# Patient Record
Sex: FEMALE | Race: BLACK OR AFRICAN AMERICAN | NOT HISPANIC OR LATINO | Employment: UNEMPLOYED | URBAN - NONMETROPOLITAN AREA
[De-identification: names, ages, dates, MRNs, and addresses within clinical notes are randomized per-mention and may not be internally consistent; named-entity substitution may affect disease eponyms.]

---

## 2022-11-03 ENCOUNTER — HOSPITAL ENCOUNTER (EMERGENCY)
Facility: HOSPITAL | Age: 37
Discharge: HOME | End: 2022-11-04
Attending: EMERGENCY MEDICINE

## 2022-11-03 VITALS
DIASTOLIC BLOOD PRESSURE: 83 MMHG | RESPIRATION RATE: 15 BRPM | WEIGHT: 155.87 LBS | SYSTOLIC BLOOD PRESSURE: 117 MMHG | TEMPERATURE: 97.8 F | OXYGEN SATURATION: 98 % | HEART RATE: 90 BPM

## 2022-11-03 DIAGNOSIS — F23 ACUTE PSYCHOSIS (HCC): Primary | ICD-10-CM

## 2022-11-03 DIAGNOSIS — F20.5 SCHIZOPHRENIA, RESIDUAL, CHRONIC (HCC): ICD-10-CM

## 2022-11-03 DIAGNOSIS — F15.10 METHAMPHETAMINE ABUSE (HCC): ICD-10-CM

## 2022-11-03 LAB
AMPHETAMINES SERPL QL SCN: POSITIVE
BARBITURATES UR QL: NEGATIVE
BENZODIAZ UR QL: NEGATIVE
BILIRUB UR QL STRIP: NEGATIVE
CLARITY UR: CLEAR
COCAINE UR QL: NEGATIVE
COLOR UR: YELLOW
ETHANOL EXG-MCNC: 0 MG/DL
EXT PREG TEST URINE: NEGATIVE
EXT. CONTROL ED NAV: NORMAL
FLUAV RNA RESP QL NAA+PROBE: NEGATIVE
FLUBV RNA RESP QL NAA+PROBE: NEGATIVE
GLUCOSE UR STRIP-MCNC: NEGATIVE MG/DL
HGB UR QL STRIP.AUTO: NEGATIVE
KETONES UR STRIP-MCNC: ABNORMAL MG/DL
LEUKOCYTE ESTERASE UR QL STRIP: NEGATIVE
METHADONE UR QL: NEGATIVE
NITRITE UR QL STRIP: NEGATIVE
OPIATES UR QL SCN: POSITIVE
OXYCODONE+OXYMORPHONE UR QL SCN: NEGATIVE
PCP UR QL: NEGATIVE
PH UR STRIP.AUTO: 6 [PH]
PROT UR STRIP-MCNC: NEGATIVE MG/DL
RSV RNA RESP QL NAA+PROBE: NEGATIVE
SARS-COV-2 RNA RESP QL NAA+PROBE: NEGATIVE
SP GR UR STRIP.AUTO: 1.01 (ref 1–1.03)
THC UR QL: NEGATIVE
UROBILINOGEN UR QL STRIP.AUTO: 0.2 E.U./DL

## 2022-11-03 RX ORDER — KETAMINE HYDROCHLORIDE 50 MG/ML
5 INJECTION, SOLUTION, CONCENTRATE INTRAMUSCULAR; INTRAVENOUS ONCE
Status: COMPLETED | OUTPATIENT
Start: 2022-11-03 | End: 2022-11-03

## 2022-11-03 RX ORDER — WATER 1000 ML/1000ML
INJECTION, SOLUTION INTRAVENOUS
Status: COMPLETED
Start: 2022-11-03 | End: 2022-11-03

## 2022-11-03 RX ORDER — ZIPRASIDONE MESYLATE 20 MG/ML
20 INJECTION, POWDER, LYOPHILIZED, FOR SOLUTION INTRAMUSCULAR ONCE
Status: COMPLETED | OUTPATIENT
Start: 2022-11-03 | End: 2022-11-03

## 2022-11-03 RX ADMIN — ZIPRASIDONE MESYLATE 20 MG: 20 INJECTION, POWDER, LYOPHILIZED, FOR SOLUTION INTRAMUSCULAR at 12:35

## 2022-11-03 RX ADMIN — WATER: 1 INJECTION INTRAMUSCULAR; INTRAVENOUS; SUBCUTANEOUS at 13:13

## 2022-11-03 RX ADMIN — KETAMINE HYDROCHLORIDE 349 MG: 50 INJECTION, SOLUTION INTRAMUSCULAR; INTRAVENOUS at 13:11

## 2022-11-03 NOTE — ED PROVIDER NOTES
History  Chief Complaint   Patient presents with   • Psychiatric Evaluation     Patient arrived by state police for a psych eval  Patient was tearful upon triage  Patient states she has been living out of her car for a few days  Patient denies SI/HI  Patient is a 39-year-old female brought in by [employer] and EMS for psychiatric evaluation  Per reports, patient has been sleeping in her car and has been parking outside of a  facility and when the facility reported her activity to police, she became agitated upon arrival   Patient states she is homeless and informed me she has from the [facility]  She states she is in this area to help a friend to has MS and depression  Patient does states she is homeless  Patient denies any recent alcohol or drug usage  She does have history of schizophrenia and previous methamphetamine usage  Patient is able to answer some questions but otherwise appears quite tangential and agitated at times  Patient did require chemical sedation for patient and staff safety  Will continue with [facility] evaluation including drug screen breath alcohol and crisis evaluation  No outward signs of trauma  None       History reviewed  No pertinent past medical history  History reviewed  No pertinent surgical history  History reviewed  No pertinent family history  I have reviewed and agree with the history as documented  E-Cigarette/Vaping     E-Cigarette/Vaping Substances     Social History     Tobacco Use   • Smoking status: Never Smoker   • Smokeless tobacco: Never Used   Substance Use Topics   • Alcohol use: Not Currently   • Drug use: Yes     Types: Marijuana       Review of Systems   Unable to perform ROS: Psychiatric disorder       Physical Exam  Physical Exam  Vitals and nursing note reviewed  Constitutional:       General: She is awake  She is not in acute distress  Appearance: Normal appearance  She is well-developed     HENT: Head: Normocephalic and atraumatic  Mouth/Throat:      Mouth: Mucous membranes are moist    Eyes:      General: No scleral icterus  Extraocular Movements: Extraocular movements intact  Conjunctiva/sclera: Conjunctivae normal    Cardiovascular:      Rate and Rhythm: Normal rate and regular rhythm  Heart sounds: Normal heart sounds  No murmur heard  No friction rub  No gallop  Pulmonary:      Effort: Pulmonary effort is normal  No respiratory distress  Breath sounds: Normal breath sounds  No wheezing, rhonchi or rales  Abdominal:      Palpations: Abdomen is soft  There is no mass  Tenderness: There is no abdominal tenderness  There is no guarding or rebound  Hernia: No hernia is present  Musculoskeletal:         General: Normal range of motion  Cervical back: Normal range of motion and neck supple  Right lower leg: No edema  Left lower leg: No edema  Skin:     General: Skin is warm and dry  Coloration: Skin is not pale  Findings: No lesion  Neurological:      General: No focal deficit present  Mental Status: She is alert  Mental status is at baseline  Psychiatric:         Mood and Affect: Mood is anxious  Affect is angry and tearful  Speech: Speech is rapid and pressured and tangential          Behavior: Behavior is uncooperative, agitated, aggressive and hyperactive  Thought Content:  Thought content is delusional          Vital Signs  ED Triage Vitals [11/03/22 1320]   Temperature Pulse Respirations Blood Pressure SpO2   97 5 °F (36 4 °C) (!) 107 17 133/73 97 %      Temp Source Heart Rate Source Patient Position - Orthostatic VS BP Location FiO2 (%)   Temporal Monitor -- -- --      Pain Score       No Pain           Vitals:    11/03/22 1530 11/03/22 1545 11/03/22 1645 11/03/22 1854   BP:    117/83   Pulse: 83 84 84 90         Visual Acuity      ED Medications  Medications   ziprasidone (GEODON) IM injection 20 mg (20 mg Intramuscular Given 11/3/22 1235)   sterile water injection **ADS Override Pull** (  Given 11/3/22 1313)   ketamine (KETALAR) 5 mg/kg (349 mg Intramuscular Given 11/3/22 1311)       Diagnostic Studies  Results Reviewed     Procedure Component Value Units Date/Time    Rapid drug screen, urine [[id number]]  (Abnormal) Collected: 11/03/22 1936    Lab Status: Final result Specimen: Urine, Clean Catch Updated: 11/03/22 2021     Amph/Meth UR Positive     Barbiturate Ur Negative     Benzodiazepine Urine Negative     Cocaine Urine Negative     Methadone Urine Negative     Opiate Urine Positive     PCP Ur Negative     THC Urine Negative     Oxycodone Urine Negative    Narrative:      Presumptive report  If requested, specimen will be sent to reference lab for confirmation  FOR MEDICAL PURPOSES ONLY  IF CONFIRMATION NEEDED PLEASE CONTACT THE LAB WITHIN 5 DAYS      Drug Screen Cutoff Levels:  AMPHETAMINE/METHAMPHETAMINES  1000 ng/mL  BARBITURATES     200 ng/mL  BENZODIAZEPINES     200 ng/mL  COCAINE      300 ng/mL  METHADONE      300 ng/mL  OPIATES      300 ng/mL  PHENCYCLIDINE     25 ng/mL  THC       50 ng/mL  OXYCODONE      100 ng/mL    POCT alcohol breath test [[id number]]  (Normal) Resulted: 11/03/22 2005    Lab Status: Final result Updated: 11/03/22 2005     EXTBreath Alcohol 0 00    UA w Reflex to Microscopic w Reflex to Culture [[id number]]  (Abnormal) Collected: 11/03/22 1936    Lab Status: Final result Specimen: Urine, Clean Catch Updated: 11/03/22 1942     Color, UA Yellow     Clarity, UA Clear     Specific Gravity, UA 1 010     pH, UA 6 0     Leukocytes, UA Negative     Nitrite, UA Negative     Protein, UA Negative mg/dl      Glucose, UA Negative mg/dl      Ketones, UA Trace mg/dl      Urobilinogen, UA 0 2 E U /dl      Bilirubin, UA Negative     Occult Blood, UA Negative    POCT pregnancy, urine [[id number]]  (Normal) Resulted: 11/03/22 1935    Lab Status: Final result Updated: 11/03/22 1935     EXT PREG TEST UR (Ref: Negative) negative     Control valid    FLU/RSV/COVID - if FLU/RSV clinically relevant [[id number]]  (Normal) Collected: 11/03/22 1345    Lab Status: Final result Specimen: Nasopharyngeal Swab Updated: 11/03/22 1432     SARS-CoV-2 Negative     INFLUENZA A PCR Negative     INFLUENZA B PCR Negative     RSV PCR Negative    Narrative:      FOR PEDIATRIC PATIENTS - copy/paste COVID Guidelines URL to browser: [email or web address]  [employer]    SARS-CoV-2 assay is a Nucleic Acid Amplification assay intended for the  qualitative detection of nucleic acid from SARS-CoV-2 in nasopharyngeal  swabs  Results are for the presumptive identification of SARS-CoV-2 RNA  Positive results are indicative of infection with SARS-CoV-2, the virus  causing COVID-19, but do not rule out bacterial infection or co-infection  with other viruses  Laboratories within the [place] and its  territories are required to report all positive results to the appropriate  public health authorities  Negative results do not preclude SARS-CoV-2  infection and should not be used as the sole basis for treatment or other  patient management decisions  Negative results must be combined with  clinical observations, patient history, and epidemiological information  This test has not been FDA cleared or approved  This test has been authorized by FDA under an Emergency Use Authorization  (EUA)  This test is only authorized for the duration of time the  declaration that circumstances exist justifying the authorization of the  emergency use of an in vitro diagnostic tests for detection of SARS-CoV-2  virus and/or diagnosis of COVID-19 infection under section 564(b)(1) of  the Act, 21 U  S C  487QVC-0(F)(0), unless the authorization is terminated  or revoked sooner  The test has been validated but independent review by FDA  and CLIA is pending  Test performed using [employer]:  This RT-PCR assay targets N2,  a region unique to SARS-CoV-2  A conserved region in the E-gene was chosen  for pan-Sarbecovirus detection which includes SARS-CoV-2  According to CMS-2020-01-R, this platform meets the definition of high-throughput technology  No orders to display              Procedures  CriticalCare Time  Performed by: Donald Roberson DO  Authorized by: Donald Roberson DO     Critical care provider statement:     Critical care time (minutes):  35    Critical care time was exclusive of:  Separately billable procedures and treating other patients and teaching time    Critical care was necessary to treat or prevent imminent or life-threatening deterioration of the following conditions:  Toxidrome  Comments:      Medications administered and monitoring for acute psychotic state             ED Course         2030 Patient is more coherent and cooperative  Ambulated to the bathroom to provide urine sample  Eating dinner  Again denies SI or HI  Labs/UDS reviewed  Evaluated by crisis - cleared from their standpoint - will obtain psych consult and have case management evaluate due to homelessness      Psychiatry consult completed  No inidcation for voluntary or involuntary hospitalization at this time  Awaiting Social Work evaluation for homelessness, etc            SBIRT 20yo+    Flowsheet Row Most Recent Value   SBIRT (25 yo +)    In order to provide better care to our patients, we are screening all of our patients for alcohol and drug use  Would it be okay to ask you these screening questions?  No Filed at: 11/03/2022 1332                    MDM  Number of Diagnoses or Management Options  Diagnosis management comments: 1600 - patient resting comfortably       Amount and/or Complexity of Data Reviewed  Clinical lab tests: ordered and reviewed  Obtain history from someone other than the patient: yes (PSP)        Disposition  Final diagnoses:   Schizophrenia, residual, chronic ([facility] ) Acute psychosis ([facility] [address] )   Methamphetamine abuse ([facility] [address] )     Time reflects when diagnosis was documented in both MDM as applicable and the Disposition within this note     Time User Action Codes Description Comment    11/3/2022  9:54 PM Binstead, Avila Schwartz T Add [F20 5] Schizophrenia[facility] residual, chronic [facility] )     11/4/2022 12:44 PM Binstead, Avila Schwartz T Add [F23] Acute psychosis ([facility] )     11/4/2022 12:44 PM Makenzie Borer T Add [F15 10] Methamphetamine abuse ([facility] )     11/4/2022 12:44 PM Binstead, Avila Schwartz T Modify [F20 5] Schizophrenia, residual, chronic ([facility] )     11/4/2022 12:44 PM Makenzie Borer T Modify [F23] Acute psychosis [facility])       ED Disposition     ED Disposition   Discharge    Condition   Stable    Date/Time   Fri Nov 4, 2022 12:44 AM    Comment   Pb Rivera discharge to home/self care  Follow-up Information     Follow up With Specialties Details Why Contact Info Additional [address] [facility] Internal Medicine Call  For re-evaluation [address] [facility] [address] [facility] [address]  [facility] [address], [address], [address] [phone]          There are no discharge medications for this patient  No discharge procedures on file      PDMP Review     None          ED Provider  Electronically Signed by           Preston Lambert DO  11/04/22 [facility]  11/04/22 [facility]

## 2022-11-04 NOTE — ED NOTES
Per ED Provider, one to one observation no longer needed  Patient is resting comfortably in bed and has no complaints        Karolina oRmero RN  11/03/22 9991

## 2022-11-04 NOTE — CONSULTS
TeleConsultation - Farhad Gutierres 39 y o  female MRN: [id number]  Unit/Bed#: RM01 Encounter: [id number]        REQUIRED DOCUMENTATION:     1  This service was provided via Telemedicine  2  Provider located at [facility]   3  TeleMed provider: Michelle Hernandez MD   4  Identify all parties in room with patient during tele consult: Patient   5  Patient was then informed that this was a Telemedicine visit and that the exam was being conducted confidentially over secure lines  My office door was closed  No one else was in the room  Patient acknowledged consent and understanding of privacy and security of the Telemedicine visit, and gave us permission to have the assistant stay in the room in order to assist with the history and to conduct the exam   I informed the patient that I have reviewed their record in Epic and presented the opportunity for them to ask any questions regarding the visit today  The patient agreed to participate  Discussed with Fracisco ARREGUIN      Assessment/Plan     Active Problems:    * No active hospital problems  *    Assessment:  Adelaide Singh is a 38 y/o female with PMH significant for Historical Diagnosis of Schizophrenia that presented on 302 for agitation  Patient without any active psychotic symptoms but is easily agitated on exam likely due to methamphetamine use  Patient without any indication for voluntary or involuntary inpatient psychiatric admission  Patient will benefit from case management consult for help with establishing housing  Stimulant Use Disorder, Severe    Treatment Plan:    Planned Medication Changes:    -None     Current Medications:         Risks / Benefits of Treatment:    Risks, benefits, and possible side effects of medications explained to patient and patient verbalizes understanding        Inpatient consult to Psychiatry  Consult performed by: Michelle Hernandez MD  Consult ordered by: Royal Armando DO        Physician Requesting Consult: Alem Mejia,*  Principal Problem:<principal problem not specified>    Reason for Consult: Psych Evaluation       History of Present Illness      Per Crisis Note By Juana Sepulveda:   Pt presented to ED via EMS, followed by the [facility]  Pt presented on 302 petitioned by the [place]  302 states: "On 11/3/22 at approximately 1030 hrs, I was dispatched to the seen when I located the subject Josephine Suarez outside the address in the area of   I observed Desarmes to be acting erratically, Desarmes related she is homeless and has been living by herself in her vehicle for approximately 3 days  Desarmes related that she suffers from Schizophrenia and is not taking medication  I observed Desarmes to be extremely manic and not being able to understand or complrehend the situation at hand  I observed Desarmes to be slightly distressed and unable to properly take care of herself  Desarmes rumbled in her statements and was extremely hostile with others on seen  Desarmes was cursing great concern of other residents in the area"  Crisis spoke to the pt over the phone  Pt was informed about the 302 and pt's rights  Pt appears to be in understanding of such  Pt reports being homeless from [facility]  Pt came to help a friend with MS but this did not work and pt was living in her car  Pt denies SI/HI  Pt denies self harming  Pt denies hallucinations  Pt at first denied any drug use, but then admits to meth use and other street drugs  Pt denies rehab  Pt denies inpatient psychiatric hospitalization  Pt states "I just nee a place to stay"  Pt denies trauma  No access to firearms reported  Attending doctor updated  Requested psychiatric consult  If cleared the case will be transferred to Case Management  Patient states that she was stopped by the police but can not explain why she was stopped  Patient states that she is currently homeless and has been sleeping in her car for the last 3 days    Patient vehicle was parked outside of  facility and the facility reported her activity which prompted polices intervention  Patient states she is from the [place] area and had came to help a friend with multiple sclerosis  Patient can become agitated redirectable  Patient denies any mood symptoms or psychotic symptoms nor any other acute psychiatric complaints  Psychiatric Review Of Systems:    sleep: no  appetite changes: no  weight changes: no  energy/anergy: no  interest/pleasure/anhedonia: no  somatic symptoms: no  anxiety/panic: no  jose: no  guilty/hopeless: no  self injurious behavior/risky behavior: no    Historical Information     Past Psychiatric History:     Psychiatric Hospitalizations:   • few past inpatient psychiatric admissions  Outpatient Treatment History:   • Denied  Suicide Attempts:   • None  History of self-harm:   • None  Violence History:   • no  Past Psychiatric medication trials: Unable to recall     Substance Abuse History:    methamphetamines how often Frequently   Use of Alcohol: denied    Longest clean time: Days  History of IP/OP rehabilitation program: Yes, inpatient [facility] rehabilitation program 1 time  Smoking history: Denied  Use of Caffeine: denies use    Family Psychiatric History:      Psychiatric Illness Mother  Illness: Bipolar Disorder    Social History:    Education: high school diploma/GED  Learning Disabilities: Denied  Marital history: single  Living arrangement, social support: The patient is presently homeless  Occupational History: unemployed  Functioning Relationships: poor support system  Other Pertinent History: None    Traumatic History:     Abuse: None  Other Traumatic Events: none    History reviewed  No pertinent past medical history      Medical Review Of Systems:    Review of Systems    Meds/Allergies     all current active meds have been reviewed  Allergies   Allergen Reactions   • Tuberculin Ppd Swelling     Swelling of arm, + ppd       Objective Vital signs in last 24 hours:  Temp:  [97 5 °F (36 4 °C)-97 8 °F (36 6 °C)] 97 8 °F (36 6 °C)  HR:  [] 90  Resp:  [15-17] 15  BP: (117-133)/(73-83) 117/83    No intake or output data in the 24 hours ending 11/03/22 2518    Mental Status Evaluation:    Appearance:  age appropriate   Behavior:  psychomotor agitation   Speech:  normal pitch and normal volume   Mood:  "Fine"   Affect:  labile   Language: naming objects   Thought Process:  tangential   Associations intact associations   Thought Content:  normal   Perceptual Disturbances: None   Risk Potential: Suicidal Ideations none  Homicidal Ideations none  Potential for Aggression No   Sensorium:  person, place and time/date   Cognition:  recent and remote memory grossly intact   Consciousness:  alert    Attention: attention span and concentration were age appropriate   Intellect: within normal limits   Fund of Knowledge: awareness of current events: President   Insight:  limited   Judgment: limited   Muscle Strength:  Muscle Tone: normal NFT  normal   Gait/Station: normal gait/station   Motor Activity: no abnormal movements       Lab Results: I have personally reviewed all pertinent laboratory/tests results  Most Recent Labs: No results found for: WBC, RBC, HGB, HCT, PLT, RDW, NEUTROABS, SODIUM, K, CL, CO2, BUN, CREATININE, GLUC, GLUF, CALCIUM, AST, ALT, ALKPHOS, TP, ALB, TBILI, CHOLESTEROL, HDL, TRIG, LDLCALC, NONHDLC, VALPROICTOT, CARBAMAZEPIN, LITHIUM, AMMONIA, [id number], FREET4, T3FREE, PREGSERUM, HCG, HCGQUANT, RPR, HGBA1C, EAG    Imaging Studies: No results found  EKG/Pathology/Other Studies: No results found for: VENTRATE, ATRIALRATE, PRINT, QRSDINT, QTINT, QTCINT, PAXIS, QRSAXIS, TWAVEAXIS     Code Status: No Order  Advance Directive and Living Will:      Power of :    POLST:      Counseling / Coordination of Care: Total floor / unit time spent today 30 minutes   Greater than 50% of total time was spent with the patient and / or family counseling and / or coordination of care  A description of the counseling / coordination of care: Direct Patient Care, Chart Review, and Documentation

## 2022-11-04 NOTE — ED NOTES
Patient given number for homeless shelter, states she does not need it that she has a car  Patient then requesting phone number for local police department  States she does not want it she wants the direct line  Patient informed that she was discharged  Patient is refusing to leave  She told me to call the police because she's not leaving  I informed her that if she does not want my assistance that there is nothing further that I can do for her  Patient still refusing to leave  Security called to bedside at this time  Francine Ribera from security escorted patient out of department at this time        Philipp Black, VIJAY  11/04/22 0619

## 2022-11-04 NOTE — ED NOTES
Pt presented to ED via EMS, followed by the [facility]  Pt presented on 302 petitioned by the [place]  302 states: "On 11/3/22 at approximately 1030 hrs, I was dispatched to the seen when I located the subject Aguila Beth outside the address in the area of   I observed Desarmes to be acting erratically, Desarmes related she is homeless and has been living by herself in her vehicle for approximately 3 days  Desarmes related that she suffers from Schizophrenia and is not taking medication  I observed Desarmes to be extremely manic and not being able to understand or complrehend the situation at hand  I observed Desarmes to be slightly distressed and unable to properly take care of herself  Desarmes rumbled in her statements and was extremely hostile with others on seen  Desarmes was cursing great concern of other residents in the area"  Crisis spoke to the pt over the phone  Pt was informed about the 302 and pt's rights  Pt appears to be in understanding of such  Pt reports being homeless from [facility]  Pt came to help a friend with MS but this did not work and pt was living in her car  Pt denies SI/HI  Pt denies self harming  Pt denies hallucinations  Pt at first denied any drug use, but then admits to meth use and other street drugs  Pt denies rehab  Pt denies inpatient psychiatric hospitalization  Pt states "I just nee a place to stay"  Pt denies trauma  No access to firearms reported  Attending doctor updated  Requested psychiatric consult  If cleared the case will be transferred to Case Management